# Patient Record
Sex: MALE | Race: WHITE | NOT HISPANIC OR LATINO | Employment: OTHER | URBAN - METROPOLITAN AREA
[De-identification: names, ages, dates, MRNs, and addresses within clinical notes are randomized per-mention and may not be internally consistent; named-entity substitution may affect disease eponyms.]

---

## 2020-08-06 ENCOUNTER — OFFICE VISIT CONVERTED (OUTPATIENT)
Dept: UROLOGY | Facility: CLINIC | Age: 30
End: 2020-08-06
Attending: UROLOGY

## 2020-08-06 ENCOUNTER — CONVERSION ENCOUNTER (OUTPATIENT)
Dept: SURGERY | Facility: CLINIC | Age: 30
End: 2020-08-06

## 2020-09-17 ENCOUNTER — PROCEDURE VISIT CONVERTED (OUTPATIENT)
Dept: UROLOGY | Facility: CLINIC | Age: 30
End: 2020-09-17
Attending: UROLOGY

## 2020-12-01 ENCOUNTER — OFFICE VISIT CONVERTED (OUTPATIENT)
Dept: UROLOGY | Facility: CLINIC | Age: 30
End: 2020-12-01
Attending: UROLOGY

## 2021-05-10 NOTE — H&P
History and Physical      Patient Name: Julien Riley   Patient ID: [id number]   Sex: Male   YOB: 1990        Visit Date: August 6, 2020    Provider: Erika Araiza MD   Location: [facility]   Location Address: [address] [place] [address]  [id number]   Location Phone: [phone]          Chief Complaint  · The patient is here for a vasectomy consultation.      History Of Present Illness  Julien Riley is here for counseling regarding vasectomy for permanent sterilization.   The procedure was discussed with the patient. Julien Riley is aware that the procedure should be considered irreversible, although at times it can be reversed with success. Risks for the procedure including local effects of selling, bleeding, pain, the possibility for recanalization, and continued fertility is also possible. Formation of a sperm granuloma also is a possibility. We discussed the procedure, preoperative preparation, and postoperative care. We also discussed the importance of continuing to use contraception post vasectomy, since the patient will not be sterile at that point. We stressed the importance of continuing to use contraception until a follow-up semen specimen is done that shows the absence of sperm. That specimen will normally be obtained eight weeks post-surgery.   Julien Riley is voluntarily requesting this procedure and understands that if it is successful he will be unable to father children. Has 2 kids; youngest 4 wks.   He has no cardiopulmonary history, no prior scrotal surgery, no blood thinners.       Allergy List    Allergies Reconciled  Review of Systems  · Constitutional  o Denies  o : fever, chills  · Eyes  o Denies  o : double vision, cataracts  · HENT  o Denies  o : headaches, hearing loss  · Cardiovascular  o Denies  o : chest pain, irregular heart beats, dyspnea on exertion, chest pain on exertion  · Respiratory  o Denies  o : shortness of breath, wheezing,  "cough  · Gastrointestinal  o Denies  o : nausea, vomiting, diarrhea, heartburn  · Genitourinary  o Denies  o : scrotal mass, penile discharge, scrotal abnormalities  · Integument  o Denies  o : rash, itching, skin lesion or lump  · Endocrine  o Denies  o : weight gain, weight loss  · Psychiatric  o Denies  o : anxiety, depression, difficulty sleeping      Vitals  Date Time BP Position Site L\R Cuff Size HR RR TEMP (F) WT  HT  BMI kg/m2 BSA m2 O2 Sat        08/06/2020 02:18 PM       14  197lbs 0oz 5'  7\" 30.85 2.06           Physical Examination  · Constitutional  o Appearance  o : well developed, well-nourished, in no acute distress.   · Neck  o Inspection/Palpation  o : normal appearance, no masses or tenderness, trachea midline  · Respiratory  o Respiratory Effort  o : breathing unlabored  o Auscultation of Lungs  o : clear to ascultation bilaterally  · Cardiovascular  o Heart  o :   § Auscultation of Heart  § : regular rate and rhythm, no murmurs  · Gastrointestinal  o Abdominal Examination  o : nontender, nondistended, no rigidity or gaurding,no hepatosplenomegaly  · Genitourinary  o Bladder  o : nonpalpable  o Penis  o : normal circumcised phallus with no masses or lesions  o Urethral Meatus  o : no inflammation present and no stenosis  o Scrotum and Scrotal Contents  o :   § Scrotum  § : bilateral vas were palpable  § Testes  § : descended testicles no masses or lesions  · Lymphatic  o Neck  o : no lymphadenopathy present, normal size  o Axilla  o : no lymphadenopathy present, normal size  o Groin  o : no lymphadenopathy present, normal size  · Neurologic  o Mental Status Examination  o :   § Orientation  § : grossly oriented to person, place and time, judgement and insight intact, normal mood and affect              Assessment  · Consultation for sterilization     V25.09/Z30.09    Problems Reconciled  Plan  · Medications  o Medications have been Reconciled  o Transition of Care or Provider " Policy  · Instructions  o The patient will schedule a vasectomy if he desires.  o Handouts were provided, vasectomy  scanned into the EMR for reference.   o Vasectomy is intended to be a permanent form of contraception.  o Vasectomy does not produce immediate sterility.  o Following vasectomy, another form of contraception is required until vas occlusion is confirmed by post-vasectomy semen analysis (PVSA).  o Even after vas occlusion is confirmed, vasectomy is not 100% reliable in preventing pregnancy.  o The risk of pregnancy after vasectomy is approximately 1 in 2,000 for men who have no sperm in the semen on post-vasectomy semen analysis showing rare non-motile sperm (RNMS).  o Repeat vasectomy is necessary in < 1% of vasectomies, provided that a technique for vas occlusion known to have low occlusive failure rate has been used.   o Patient's should refrain from ejaculation for approximately 1 week after vasectomy.  o Options for fertility after vasectomy reversal and sperm retrival with in vitro fertilization. These options are not always successful, and are very expensive.   o The rates of surgical complications such as symptomatic hematoma and infection are 1-2%.  o Chronic scrotal pain associated with negative impact on quality of life occurs after vasectomy in about 1-2% of men. Few of these men require addtional surgery.   o Other permanent and non-permanent alternatives to vasectomy are avaliable.  o Patient voiced understanding of the above statements.  o Electronically Identified Patient Education Materials Provided Electronically     valium prescription provided  aware he must have  present             Electronically Signed by: Erika Araiza MD -Author on August 6, 2020 02:35:07 PM

## 2021-05-10 NOTE — PROCEDURES
Procedure Note      Patient Name: Julien Riley   Patient ID: [id number]   Sex: Male   YOB: 1990        Visit Date: September 17, 2020    Provider: Erika Araiza MD   Location: [facility]   Location Address: [address] [place] [address]  [id number]   Location Phone: [phone]          Vasectomy Procedure  Procedure: Bilateral Vasectomy   Pre-procedure Diagnosis: Undesired Fertility   Post-procedure Diagnosis: Same   Surgeon: Erika Veliz MD   Anesthesia: Local, 10 cc of 1% Lidocaine   Indications Julien Riley with undesired fertility, who presents today for bilateral vasectomy for permanent contraception.   Procedure Details:   The patient was appropriately identified, brought into the procedure room, and placed supine on the procedure table. A time was undertaken documenting the correct patient, site and procedure. His scrotum was prepped and draped in the standard sterile fashion. We first approached the right vas deferens. This was identified,  from the spermatic cord structures, and brought to the anterolateral aspect of the hemiscrotum. The local anesthetic solution was injected and once an adequate level of local anesthesia was achieved, a scalpel was used to make a 1 cm incision in the skin overlying the vas deferens. A sharp hemostat was used to dissect the level of the vas deferens and on both of its sides, allowing for ring forceps to be introduced and grasp the vas deferens and externalize it through the skin incision. We then used a combination of sharp and blunt dissection to release the exposed vasal segment from all surrounding tissues. An approximately 1 cm piece of vas deferens was then sharply excised and removed. One blade of a sharp hemostat was used to probe each end of the severed vas deferens, confirming the presence of a vasal lumen. Electocautery was then used to fulgurate both cut surfaces of the severed vas deferens. The abdominal side  of the vas deferens was then placed underneath some perivasal tissues that were closed above it, using a figure-of-eight 3-0 chromic stitch, thus placing the two edges of the severed vas deferens in different tissue planes. Hemostasis was confirmed and the severed vas deferens was allowed to drop back into the scrotum. The skin was closed with a single 3-0 Chromic stitch. We then turned our attention to the left vas deferens. This was also identified and brought to the anterolateral aspect of the ipsilateral hemiscrotum. The local anesthetic was then delivered on this side, and again, the scalpel was used to make an approximately 1 cm inicision in the skin overlying the vas deferens. An identical procedure was then carried out on the vas deferens. Both wounds were dressed with bacitracin ointment, folded 4x4 gauze, and bulky fluffs. The patient tolerated the procedure well and there were no intraoperative or immediate postoperative complications.           Assessment  · Sterilization     V25.2    Problems Reconciled  Plan  · Orders  o Vasectomy (36294) - V25.2 - 09/17/2020  · Medications  o Medications have been Reconciled  o Transition of Care or Provider Policy  · Instructions  o Wound Care discussed.  o Patient reminded to continue another method for contraception, until he has had a post-vasectomy semen analysis that is negative for sperm.. Patient voiced understanding.   o First post-void analysis in 10 weeks.  o Electronically Identified Patient Education Materials Provided Electronically            Electronically Signed by: Erika Araiza MD -Author on September 17, 2020 12:35:43 PM

## 2021-05-13 NOTE — PROGRESS NOTES
"   Quick Note      Patient Name: Julien Riley   Patient ID: [id number]   Sex: Male   YOB: 1990        Visit Date: December 1, 2020    Provider: Erika Araiza MD   Location: [facility]   Location Address: [address] [place] [address]  [id number]   Location Phone: [phone]          History Of Present Illness  Julien Riley is a 30 year old male who is here status post vasectomy. 0 sperm noted on a #20 power field. Denies scrotal complaints or concerns.       Vitals  Date Time BP Position Site L\R Cuff Size HR RR TEMP (F) WT  HT  BMI kg/m2 BSA m2 O2 Sat FR L/min FiO2 HC       12/01/2020 02:18 PM       12  197lbs 0oz 5'  7\" 30.85 2.06                 Assessment  · Postoperative Visit-status post vasectomy     V67.00    Problems Reconciled  Plan  · Orders  o IOP - Semen Analysis (79165, 70526) - V67.00 - 12/01/2020  · Medications  o Medications have been Reconciled  o Transition of Care or Provider Policy  · Instructions  o Instructed patient to follow-up prn.  o Electronically Identified Patient Education Materials Provided Electronically            Electronically Signed by: Erika Araiza MD -Author on December 1, 2020 03:46:16 PM  "

## 2021-05-14 VITALS — HEIGHT: 67 IN | WEIGHT: 197 LBS | RESPIRATION RATE: 12 BRPM | BODY MASS INDEX: 30.92 KG/M2

## 2021-05-15 VITALS — HEIGHT: 67 IN | RESPIRATION RATE: 14 BRPM | BODY MASS INDEX: 30.92 KG/M2 | WEIGHT: 197 LBS

## 2021-08-17 ENCOUNTER — OFFICE VISIT (OUTPATIENT)
Dept: CARDIOLOGY | Facility: CLINIC | Age: 31
End: 2021-08-17

## 2021-08-17 VITALS
HEIGHT: 67 IN | WEIGHT: 197 LBS | SYSTOLIC BLOOD PRESSURE: 138 MMHG | HEART RATE: 66 BPM | BODY MASS INDEX: 30.92 KG/M2 | DIASTOLIC BLOOD PRESSURE: 94 MMHG

## 2021-08-17 DIAGNOSIS — R07.89 ATYPICAL CHEST PAIN: ICD-10-CM

## 2021-08-17 DIAGNOSIS — I10 ESSENTIAL HYPERTENSION: ICD-10-CM

## 2021-08-17 DIAGNOSIS — R00.1 SINUS BRADYCARDIA: Primary | ICD-10-CM

## 2021-08-17 PROCEDURE — 93000 ELECTROCARDIOGRAM COMPLETE: CPT | Performed by: INTERNAL MEDICINE

## 2021-08-17 PROCEDURE — 99204 OFFICE O/P NEW MOD 45 MIN: CPT | Performed by: INTERNAL MEDICINE

## 2021-08-17 RX ORDER — LOSARTAN POTASSIUM 25 MG/1
25 TABLET ORAL DAILY
Qty: 90 TABLET | Refills: 3 | Status: SHIPPED | OUTPATIENT
Start: 2021-08-17

## 2022-09-21 DIAGNOSIS — I10 ESSENTIAL HYPERTENSION: ICD-10-CM

## 2022-09-21 RX ORDER — LOSARTAN POTASSIUM 25 MG/1
TABLET ORAL
Qty: 90 TABLET | Refills: 3 | OUTPATIENT
Start: 2022-09-21